# Patient Record
Sex: MALE | URBAN - METROPOLITAN AREA
[De-identification: names, ages, dates, MRNs, and addresses within clinical notes are randomized per-mention and may not be internally consistent; named-entity substitution may affect disease eponyms.]

---

## 2019-05-09 ENCOUNTER — APPOINTMENT (RX ONLY)
Dept: URBAN - METROPOLITAN AREA CLINIC 172 | Facility: CLINIC | Age: 39
Setting detail: DERMATOLOGY
End: 2019-05-09

## 2019-05-09 DIAGNOSIS — L63.8 OTHER ALOPECIA AREATA: ICD-10-CM | Status: INADEQUATELY CONTROLLED

## 2019-05-09 PROCEDURE — ? INTRALESIONAL KENALOG

## 2019-05-09 PROCEDURE — 11900 INJECT SKIN LESIONS </W 7: CPT

## 2019-05-09 PROCEDURE — ? COUNSELING

## 2019-05-09 ASSESSMENT — LOCATION DETAILED DESCRIPTION DERM
LOCATION DETAILED: LEFT SUPERIOR MEDIAL FOREHEAD
LOCATION DETAILED: LEFT MEDIAL FRONTAL SCALP
LOCATION DETAILED: LEFT CENTRAL FRONTAL SCALP
LOCATION DETAILED: LEFT SUPERIOR FOREHEAD

## 2019-05-09 ASSESSMENT — LOCATION ZONE DERM
LOCATION ZONE: SCALP
LOCATION ZONE: FACE

## 2019-05-09 ASSESSMENT — LOCATION SIMPLE DESCRIPTION DERM
LOCATION SIMPLE: LEFT FOREHEAD
LOCATION SIMPLE: LEFT SCALP

## 2019-05-09 NOTE — PROCEDURE: INTRALESIONAL KENALOG
Concentration Of Solution Injected (Mg/Ml): 3.3
Administered By (Optional): Dr. Knight
Medical Necessity Clause: This procedure was medically necessary because the lesions that were treated were:
Include Z78.9 (Other Specified Conditions Influencing Health Status) As An Associated Diagnosis?: No
Total Volume Injected (Ccs- Only Use Numbers And Decimals): 1
X Size Of Lesion In Cm (Optional): 0
Lot # (Optional): [id number]
Detail Level: Detailed
Kenalog Preparation: Kenalog
Expiration Date (Optional): 2/2020
Consent: The risks of atrophy were reviewed with the patient.

## 2023-02-27 NOTE — HPI: HAIR LOSS
Called patient.  Reschedule patient hospital follow up appointment with PCP Louisa instead of a different provider.  PCP is unable to sign off or follow through home care orders since not seen patient for over 1 year.  Appt reschedule to this Thursday at 3:00 PM.    Called Ana and left secure VM.  Gave verbal orders per Louisa LABOY CNP for Order(s): Home Care Orders: Skilled Nursing:  Ana RN calling from [facility] requesting verbal orders for PT and OT evaluations, skilled nursing 2x per week for 3 weeks, and then 1x per week for 6 weeks. Including 4 PRN visits.  Pt hospital follow up reschedule to see Louisa this Thursday.      Brodie Negron CMA (AAMA) at 1:30 PM on 2/27/2023     
[facility] Call Center    Phone Message    May a detailed message be left on voicemail: yes     Reason for Call: Order(s): Home Care Orders: Skilled Nursing:  Ana WILLS calling from [facility] requesting verbal orders for PT and OT evaluations, skilled nursing 2x per week for 3 weeks, and then 1x per week for 6 weeks. Including 4 PRN visits.    Action Taken: Message routed to:  [facility] pcc    Travel Screening: Not Applicable                                                                      
How Did The Hair Loss Occur?: gradual in onset
How Severe Is Your Hair Loss?: mild